# Patient Record
Sex: MALE | Race: WHITE | Employment: OTHER | URBAN - METROPOLITAN AREA
[De-identification: names, ages, dates, MRNs, and addresses within clinical notes are randomized per-mention and may not be internally consistent; named-entity substitution may affect disease eponyms.]

---

## 2022-01-12 ENCOUNTER — OFFICE VISIT (OUTPATIENT)
Dept: FAMILY MEDICINE | Facility: CLINIC | Age: 62
End: 2022-01-12
Payer: COMMERCIAL

## 2022-01-12 VITALS
HEART RATE: 56 BPM | DIASTOLIC BLOOD PRESSURE: 82 MMHG | HEIGHT: 68 IN | RESPIRATION RATE: 11 BRPM | TEMPERATURE: 98 F | OXYGEN SATURATION: 98 % | WEIGHT: 231 LBS | SYSTOLIC BLOOD PRESSURE: 134 MMHG | BODY MASS INDEX: 35.01 KG/M2

## 2022-01-12 DIAGNOSIS — Z13.220 SCREENING CHOLESTEROL LEVEL: ICD-10-CM

## 2022-01-12 DIAGNOSIS — Z13.1 DIABETES MELLITUS SCREENING: ICD-10-CM

## 2022-01-12 DIAGNOSIS — Z00.00 ROUTINE GENERAL MEDICAL EXAMINATION AT A HEALTH CARE FACILITY: Primary | ICD-10-CM

## 2022-01-12 DIAGNOSIS — Z12.5 PROSTATE CANCER SCREENING: ICD-10-CM

## 2022-01-12 DIAGNOSIS — L85.3 XEROSIS CUTIS: ICD-10-CM

## 2022-01-12 LAB
CHOLEST SERPL-MCNC: 189 MG/DL
FASTING STATUS PATIENT QL REPORTED: YES
FASTING STATUS PATIENT QL REPORTED: YES
GLUCOSE BLD-MCNC: 108 MG/DL (ref 70–99)
HDLC SERPL-MCNC: 55 MG/DL
LDLC SERPL CALC-MCNC: 89 MG/DL
NONHDLC SERPL-MCNC: 134 MG/DL
PSA SERPL-MCNC: 0.51 UG/L (ref 0–4)
TRIGL SERPL-MCNC: 227 MG/DL

## 2022-01-12 PROCEDURE — 99386 PREV VISIT NEW AGE 40-64: CPT | Performed by: FAMILY MEDICINE

## 2022-01-12 PROCEDURE — G0103 PSA SCREENING: HCPCS | Performed by: FAMILY MEDICINE

## 2022-01-12 PROCEDURE — 82947 ASSAY GLUCOSE BLOOD QUANT: CPT | Performed by: FAMILY MEDICINE

## 2022-01-12 PROCEDURE — 80061 LIPID PANEL: CPT | Performed by: FAMILY MEDICINE

## 2022-01-12 PROCEDURE — 36415 COLL VENOUS BLD VENIPUNCTURE: CPT | Performed by: FAMILY MEDICINE

## 2022-01-12 RX ORDER — TRIAMCINOLONE ACETONIDE 1 MG/G
CREAM TOPICAL 3 TIMES DAILY PRN
Qty: 80 G | Refills: 2 | Status: SHIPPED | OUTPATIENT
Start: 2022-01-12

## 2022-01-12 ASSESSMENT — MIFFLIN-ST. JEOR: SCORE: 1829.37

## 2022-01-12 NOTE — Clinical Note
Please abstract the following data from this visit with this patient into the appropriate field in Epic:    Tests that can be patient reported without a hard copy:    Colonoscopy done on this date: 6/15/2013 (approximately), by this group: Mike, results were normal .     Other Tests found in the patient's chart through Chart Review/Care Everywhere:    {Abstract Quality List (Optional):962252}    Note to Abstraction: If this section is blank, no results were found via Chart Review/Care Everywhere.

## 2022-01-12 NOTE — PROGRESS NOTES
SUBJECTIVE:   CC: Miguel Ángel Tiwari is an 61 year old male who presents for preventative health visit.       Patient has been advised of split billing requirements and indicates understanding: Yes  HPI        Today's PHQ-2 Score: No flowsheet data found.    Abuse: Current or Past(Physical, Sexual or Emotional)- No  Do you feel safe in your environment? Yes        Social History     Tobacco Use     Smoking status: Never Smoker     Smokeless tobacco: Never Used   Substance Use Topics     Alcohol use: Yes     Alcohol/week: 5.0 standard drinks     Types: 5 Standard drinks or equivalent per week     If you drink alcohol do you typically have >3 drinks per day or >7 drinks per week? No    No flowsheet data found.No flowsheet data found.    Last PSA: No results found for: PSA    Reviewed orders with patient. Reviewed health maintenance and updated orders accordingly - Yes  Lab work is in process  Labs reviewed in EPIC  BP Readings from Last 3 Encounters:   01/12/22 134/82    Wt Readings from Last 3 Encounters:   01/12/22 104.8 kg (231 lb)                    Reviewed and updated as needed this visit by clinical staff  Tobacco   Meds  Problems  Med Hx  Surg Hx  Fam Hx         Reviewed and updated as needed this visit by Provider  Tobacco   Meds  Problems  Med Hx  Surg Hx  Fam Hx        Here today for routine checkup and to establish care.  Is able to review previous records through his previous provider.  Of note colonoscopy in 2013.  Doing well.  Reports no interval health concerns.  Working on weight loss.  Temporarily here as he is building a house in [employer].  Works in commercial Recommend sales.    Review of Systems  CONSTITUTIONAL: NEGATIVE for fever, chills, change in weight  INTEGUMENTARY/SKIN: Dry itchy skin across lower back and upper buttock  EYES: NEGATIVE for vision changes or irritation  ENT: NEGATIVE for ear, mouth and throat problems  RESP: NEGATIVE for significant cough or SOB  CV: NEGATIVE  "for chest pain, palpitations or peripheral edema  GI: NEGATIVE for nausea, abdominal pain, heartburn, or change in bowel habits   male: negative for dysuria, hematuria, decreased urinary stream, erectile dysfunction, urethral discharge  MUSCULOSKELETAL: NEGATIVE for significant arthralgias or myalgia  NEURO: NEGATIVE for weakness, dizziness or paresthesias  PSYCHIATRIC: NEGATIVE for changes in mood or affect    OBJECTIVE:   /82   Pulse 56   Temp 98  F (36.7  C)   Resp 11   Ht 1.715 m (5' 7.5\")   Wt 104.8 kg (231 lb)   SpO2 98%   BMI 35.65 kg/m      Physical Exam  GENERAL: healthy, alert and no distress  EYES: Eyes grossly normal to inspection, PERRL and conjunctivae and sclerae normal  HENT: ear canals and TM's normal, nose and mouth without ulcers or lesions  NECK: no adenopathy, no asymmetry, masses, or scars and thyroid normal to palpation  RESP: lungs clear to auscultation - no rales, rhonchi or wheezes  CV: regular rate and rhythm, normal S1 S2, no S3 or S4, no murmur, click or rub, no peripheral edema and peripheral pulses strong  ABDOMEN: soft, nontender, no hepatosplenomegaly, no masses and bowel sounds normal  MS: no gross musculoskeletal defects noted, no edema  SKIN: Large areas of poorly defined dry irritated skin lower back and upper buttocks  NEURO: Normal strength and tone, mentation intact and speech normal  PSYCH: mentation appears normal, affect normal/bright    Diagnostic Test Results:  Labs reviewed in Epic    ASSESSMENT/PLAN:   (Z00.00) Routine general medical examination at a health care facility  (primary encounter diagnosis)  Comment: Routine health maintenance otherwise up-to-date.  Colonoscopy due in 2023 or other type of screening  Plan:     (L85.3) Xerosis cutis  Comment: Patient was concerned that this is psoriasis and I reassured him that it is not.  It is simply xerosis and we will treat with aggressive moisturizing and topical cortisone  Plan: triamcinolone (KENALOG) " "0.1 % external cream            (Z13.220) Screening cholesterol level  Comment:   Plan: Lipid panel reflex to direct LDL Fasting            (Z13.1) Diabetes mellitus screening  Comment: Previous value 111 but A1c has always been about 5.4  Plan: Glucose            (Z12.5) Prostate cancer screening  Comment:   Plan: Prostate Specific Antigen Screen              Patient has been advised of split billing requirements and indicates understanding: Yes  COUNSELING:   Reviewed preventive health counseling, as reflected in patient instructions       Regular exercise       Healthy diet/nutrition       Vision screening       Colon cancer screening       Prostate cancer screening    Estimated body mass index is 35.65 kg/m  as calculated from the following:    Height as of this encounter: 1.715 m (5' 7.5\").    Weight as of this encounter: 104.8 kg (231 lb).     Weight management plan: Discussed healthy diet and exercise guidelines    He reports that he has never smoked. He has never used smokeless tobacco.      Counseling Resources:  ATP IV Guidelines  Pooled Cohorts Equation Calculator  FRAX Risk Assessment  ICSI Preventive Guidelines  Dietary Guidelines for Americans, 2010  USDA's MyPlate  ASA Prophylaxis  Lung CA Screening    Chetna Guerrero MD  [facility]"

## 2022-02-14 ENCOUNTER — MYC MEDICAL ADVICE (OUTPATIENT)
Dept: FAMILY MEDICINE | Facility: CLINIC | Age: 62
End: 2022-02-14
Payer: COMMERCIAL

## 2022-02-14 DIAGNOSIS — L85.3 XEROSIS CUTIS: Primary | ICD-10-CM

## 2022-03-07 ENCOUNTER — MYC MEDICAL ADVICE (OUTPATIENT)
Dept: DERMATOLOGY | Facility: CLINIC | Age: 62
End: 2022-03-07
Payer: COMMERCIAL

## 2022-03-14 ENCOUNTER — VIRTUAL VISIT (OUTPATIENT)
Dept: DERMATOLOGY | Facility: CLINIC | Age: 62
End: 2022-03-14
Attending: INTERNAL MEDICINE
Payer: COMMERCIAL

## 2022-03-14 DIAGNOSIS — B35.4 TINEA CORPORIS: Primary | ICD-10-CM

## 2022-03-14 PROCEDURE — 99204 OFFICE O/P NEW MOD 45 MIN: CPT | Mod: 95 | Performed by: DERMATOLOGY

## 2022-03-14 NOTE — NURSING NOTE
Chief Complaint   Patient presents with     Derm Problem     Consult, rash on buttocks. Patient stated they prescribed a cream and it made rash worse. Sometimes shows up darker or itches more. Had for years. Spreads up back and sometimes under armpits as well.

## 2022-03-14 NOTE — LETTER
3/14/2022       RE: Miguel Ángel Tiwari  [address]  [facility] [address]     Dear Colleague,    Thank you for referring your patient, Miguel Ángel Tiwari, to the [facility] DERMATOLOGY CLINIC [place] at [facility]. Please see a copy of my visit note below.    [facility] Dermatology Note  Encounter Date: Mar 14, 2022  Store-and-Forward and Telephone ([phone]). Location of teledermatologist: [facility] DERMATOLOGY [facility].  Start time: 8:41. End time: 9:09.    Dermatology Problem List:  1. 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 Tinea corporis  Image quality is poor, but there is some evidence of raised lesions on the most superior lesion on the photo provided, which would be consistent with tinea. Triamcinolone did make it worse. The patient does endorses worse rash on the edges of the patches. Folliculocentric papulopustular component concerning for evolving Majocchi's granuloma, which may require systemic treatment.   - Will start topical terbinafine, 2-3x/day, for 3 weeks. Put on all areas of rash, and extend past margins of rash by at least 1 cm   - Discussed the topical may not work well if he does have majocchi's granuloma, which will likely require oral terbinafine if needed. Will consider at next visit.       Procedures Performed:    None    Follow-up: 4 week(s) in person, or earlier for new or changing lesions    Staff and Medical Student:     Anam Marrero MS4    Staff Physician Comments:   I evaluated any available patient photographs with the medical student and I edited the assessment and plan as documented in the note. I was present on the line and participated in the entire telephone call.    Jeffry Peña MD   of  "Dermatology  Department of Dermatology  [facility]    ____________________________________________    CC: Derm Problem (Consult, rash on buttocks. Patient stated they prescribed a cream and it made rash worse. Sometimes shows up darker or itches more. Had for years. Spreads up back and sometimes under armpits as well. )    HPI:  Mr. Miguel Ángel Tiwari is a(n) 61 year old male who presents today as a new patient for \"Dry area that gets flaky\" on buttocks.    This rash has \"always kind of there\", has sometimes gone up his back. It can cause \"pimples by hair follicles\" at time. It itches, crusts occasionally, but has loose scale. Triggers for flares with heavy exertion, sweating, and sitting for long periods. Does not bleed nor have discharge. Lotion helps with symptoms (not as itchy). He does get similar area on in left armpit and groin. Antifungal spray (tenatcin and lotrimin work), but has not tried. Was prescribed triamcinolone 0.1%, made it worse.     Patient is otherwise feeling well, without additional skin concerns.    Labs Reviewed:  N/A    Physical Exam:  Vitals: There were no vitals taken for this visit.  SKIN: Teledermatology photos were reviewed; image quality and interpretability: poor. Image date: 2/14/22.  - multiple large well-demarcated erythematous patches with raised edges on buttocks. Possible follicular associated erythema  - No other lesions of concern on areas examined.     Medications:  Current Outpatient Medications   Medication     triamcinolone (KENALOG) 0.1 % external cream     No current facility-administered medications for this visit.      Past Medical/Surgical History:   There is no problem list on file for this patient.    No past medical history on file.    CC Husam Yanes MD  [address]  [place]  [place] [address] on close of this encounter.      " Statement Selected

## 2022-03-14 NOTE — PROGRESS NOTES
[facility] Dermatology Note  Encounter Date: Mar 14, 2022  Store-and-Forward and Telephone ([phone]). Location of teledermatologist: [facility] DERMATOLOGY CLINIC [place].  Start time: 8:41. End time: 9:09.    Dermatology Problem List:  1. 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 Tinea corporis  Image quality is poor, but there is some evidence of raised lesions on the most superior lesion on the photo provided, which would be consistent with tinea. Triamcinolone did make it worse. The patient does endorses worse rash on the edges of the patches. Folliculocentric papulopustular component concerning for evolving Majocchi's granuloma, which may require systemic treatment.   - Will start topical terbinafine, 2-3x/day, for 3 weeks. Put on all areas of rash, and extend past margins of rash by at least 1 cm   - Discussed the topical may not work well if he does have majocchi's granuloma, which will likely require oral terbinafine if needed. Will consider at next visit.       Procedures Performed:    None    Follow-up: 4 week(s) in person, or earlier for new or changing lesions    Staff and Medical Student:     Anam Marrero MS4    Staff Physician Comments:   I evaluated any available patient photographs with the medical student and I edited the assessment and plan as documented in the note. I was present on the line and participated in the entire telephone call.    Jeffry Peña MD   of Dermatology  Department of Dermatology  [facility]    ____________________________________________    CC: Derm Problem (Consult, rash on buttocks. Patient stated they prescribed a cream and it made rash worse. Sometimes shows up darker or itches more. Had for years. Spreads up back and  "sometimes under armpits as well. )    HPI:  Mr. Miguel Ángel Tiwari is a(n) 61 year old male who presents today as a new patient for \"Dry area that gets flaky\" on buttocks.    This rash has \"always kind of there\", has sometimes gone up his back. It can cause \"pimples by hair follicles\" at time. It itches, crusts occasionally, but has loose scale. Triggers for flares with heavy exertion, sweating, and sitting for long periods. Does not bleed nor have discharge. Lotion helps with symptoms (not as itchy). He does get similar area on in left armpit and groin. Antifungal spray (tenatcin and lotrimin work), but has not tried. Was prescribed triamcinolone 0.1%, made it worse.     Patient is otherwise feeling well, without additional skin concerns.    Labs Reviewed:  N/A    Physical Exam:  Vitals: There were no vitals taken for this visit.  SKIN: Teledermatology photos were reviewed; image quality and interpretability: poor. Image date: 2/14/22.  - multiple large well-demarcated erythematous patches with raised edges on buttocks. Possible follicular associated erythema  - No other lesions of concern on areas examined.     Medications:  Current Outpatient Medications   Medication     triamcinolone (KENALOG) 0.1 % external cream     No current facility-administered medications for this visit.      Past Medical/Surgical History:   There is no problem list on file for this patient.    No past medical history on file.    CC Husam Yanes MD  [address]  [place]  [place] [address] on close of this encounter.  "

## 2022-04-07 ENCOUNTER — OFFICE VISIT (OUTPATIENT)
Dept: DERMATOLOGY | Facility: CLINIC | Age: 62
End: 2022-04-07
Payer: COMMERCIAL

## 2022-04-07 DIAGNOSIS — L73.9 FOLLICULITIS: ICD-10-CM

## 2022-04-07 DIAGNOSIS — B35.4 TINEA CORPORIS: Primary | ICD-10-CM

## 2022-04-07 PROCEDURE — 99213 OFFICE O/P EST LOW 20 MIN: CPT | Performed by: DERMATOLOGY

## 2022-04-07 RX ORDER — CLINDAMYCIN PHOSPHATE 10 UG/ML
LOTION TOPICAL 2 TIMES DAILY
Qty: 60 ML | Refills: 3 | Status: SHIPPED | OUTPATIENT
Start: 2022-04-07

## 2022-04-07 ASSESSMENT — PAIN SCALES - GENERAL: PAINLEVEL: NO PAIN (0)

## 2022-04-07 NOTE — LETTER
4/7/2022       RE: Miguel Ángel Tiwari  [address]  [place] [facility] [address]     Dear Colleague,    Thank you for referring your patient, Miguel Ángel Tiwari, to the [facility] DERMATOLOGY CLINIC [place] at [facility]. Please see a copy of my visit note below.    [facility] Dermatology Note    Encounter Date: Apr 7, 2022    Dermatology Problem List:  1. 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 Folliculitis: with prurigo nodularis  - clindamycin lotion  ______________________________________    Impression/Plan:  1. Tinea corporis: doing very well with topicals and no areas concerning for Majocchi's granuloma at this time. We discussed continued use of topicals for another few weeks. If recurs, particularly with deep/follicular involvement, would consider biopsy and/or oral terbinafine.  - continue topical terbinafine cream BID for 2 more weeks    2. Prurigo nodularis: in association with folliculitis on scalp. Start clindamycin lotion BID when folliculitis develops. Avoid scratching.      Follow-up as needed.       Staff Involved:  Staff Only    Jeffry Peña MD   of Dermatology  Department of Dermatology  [facility]      CC:   Chief Complaint   Patient presents with     Derm Problem     Miguel Ángel is here today for a possible biopsy.       History of Present Illness:  Mr. Miguel Ángel Tiwari is a 61 year old male who presents as a return patient for tinea corporis    Tinea corporis - overall much improved with no active rash but a bit of scaling  - rash waxes and wanes - tends to flare every 6 months or so  - friction from clothes, increased activity correlated with flares  - lots of flaking when active - like dandruff when taking off underwear  - sometimes pimple-like areas when active, in areas of red  rash    A few bumps on back of neck/occipital hairline - sometimes pus bumps, sometimes itchy    Past Medical History:   No past medical history on file.  No past surgical history on file.    Social History:   reports that he has never smoked. He has never used smokeless tobacco. He reports current alcohol use of about 5.0 standard drinks of alcohol per week.    Family History:  No family history on file.    Medications:  Current Outpatient Medications   Medication Sig Dispense Refill     terbinafine (LAMISIL) 1 % external cream Apply topically 2 times daily 126 g 3     triamcinolone (KENALOG) 0.1 % external cream Apply topically 3 times daily as needed (dry, itchy skin) (Patient not taking: Reported on 4/7/2022) 80 g 2     No Known Allergies    Physical exam:  Vitals: There were no vitals taken for this visit.  GEN: This is a well developed, well-nourished male in no acute distress, in a pleasant mood.    SKIN: Najera phototype I  - Focused examination of the back, buttocks, legs, face was performed.  - faint xerosis on the thighs and buttocks, no active rash  - No other lesions of concern on areas examined.

## 2022-04-07 NOTE — NURSING NOTE
Dermatology Rooming Note    Miguel Ángel Tiwari's goals for this visit include:   Chief Complaint   Patient presents with     Derm Problem     Miguel Ángel is here today for a possible biopsy.     Cortez Gay, CMA

## 2022-04-07 NOTE — PROGRESS NOTES
[facility] Dermatology Note    Encounter Date: Apr 7, 2022    Dermatology Problem List:  1. 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 Folliculitis: with prurigo nodularis  - clindamycin lotion  ______________________________________    Impression/Plan:  1. Tinea corporis: doing very well with topicals and no areas concerning for Majocchi's granuloma at this time. We discussed continued use of topicals for another few weeks. If recurs, particularly with deep/follicular involvement, would consider biopsy and/or oral terbinafine.  - continue topical terbinafine cream BID for 2 more weeks    2. Prurigo nodularis: in association with folliculitis on scalp. Start clindamycin lotion BID when folliculitis develops. Avoid scratching.      Follow-up as needed.       Staff Involved:  Staff Only    Jeffry Peña MD   of Dermatology  Department of Dermatology  [facility]      CC:   Chief Complaint   Patient presents with     Derm Problem     Miguel Ángel is here today for a possible biopsy.       History of Present Illness:  Mr. Miguel Ángel Tiwari is a 61 year old male who presents as a return patient for tinea corporis    Tinea corporis - overall much improved with no active rash but a bit of scaling  - rash waxes and wanes - tends to flare every 6 months or so  - friction from clothes, increased activity correlated with flares  - lots of flaking when active - like dandruff when taking off underwear  - sometimes pimple-like areas when active, in areas of red rash    A few bumps on back of neck/occipital hairline - sometimes pus bumps, sometimes itchy    Past Medical History:   No past medical history on file.  No past surgical history on file.    Social History:   reports that he has never smoked. He has never used smokeless tobacco. He reports current alcohol use of  about 5.0 standard drinks of alcohol per week.    Family History:  No family history on file.    Medications:  Current Outpatient Medications   Medication Sig Dispense Refill     terbinafine (LAMISIL) 1 % external cream Apply topically 2 times daily 126 g 3     triamcinolone (KENALOG) 0.1 % external cream Apply topically 3 times daily as needed (dry, itchy skin) (Patient not taking: Reported on 4/7/2022) 80 g 2     No Known Allergies    Physical exam:  Vitals: There were no vitals taken for this visit.  GEN: This is a well developed, well-nourished male in no acute distress, in a pleasant mood.    SKIN: Najera phototype I  - Focused examination of the back, buttocks, legs, face was performed.  - faint xerosis on the thighs and buttocks, no active rash  - No other lesions of concern on areas examined.

## 2022-10-03 ENCOUNTER — HEALTH MAINTENANCE LETTER (OUTPATIENT)
Age: 62
End: 2022-10-03

## 2023-05-20 ENCOUNTER — HEALTH MAINTENANCE LETTER (OUTPATIENT)
Age: 63
End: 2023-05-20